# Patient Record
Sex: MALE | Race: WHITE | NOT HISPANIC OR LATINO | URBAN - METROPOLITAN AREA
[De-identification: names, ages, dates, MRNs, and addresses within clinical notes are randomized per-mention and may not be internally consistent; named-entity substitution may affect disease eponyms.]

---

## 2017-05-26 ENCOUNTER — OUTPATIENT (OUTPATIENT)
Dept: OUTPATIENT SERVICES | Facility: HOSPITAL | Age: 57
LOS: 1 days | Discharge: HOME | End: 2017-05-26

## 2017-06-28 DIAGNOSIS — E55.9 VITAMIN D DEFICIENCY, UNSPECIFIED: ICD-10-CM

## 2017-09-13 ENCOUNTER — OUTPATIENT (OUTPATIENT)
Dept: OUTPATIENT SERVICES | Facility: HOSPITAL | Age: 57
LOS: 1 days | Discharge: HOME | End: 2017-09-13

## 2017-09-13 DIAGNOSIS — M19.041 PRIMARY OSTEOARTHRITIS, RIGHT HAND: ICD-10-CM

## 2017-09-13 DIAGNOSIS — M65.841 OTHER SYNOVITIS AND TENOSYNOVITIS, RIGHT HAND: ICD-10-CM

## 2017-09-13 DIAGNOSIS — Z01.818 ENCOUNTER FOR OTHER PREPROCEDURAL EXAMINATION: ICD-10-CM

## 2017-09-27 ENCOUNTER — OUTPATIENT (OUTPATIENT)
Dept: OUTPATIENT SERVICES | Facility: HOSPITAL | Age: 57
LOS: 1 days | Discharge: HOME | End: 2017-09-27

## 2017-10-04 DIAGNOSIS — M19.041 PRIMARY OSTEOARTHRITIS, RIGHT HAND: ICD-10-CM

## 2017-10-04 DIAGNOSIS — I10 ESSENTIAL (PRIMARY) HYPERTENSION: ICD-10-CM

## 2017-10-04 DIAGNOSIS — E11.9 TYPE 2 DIABETES MELLITUS WITHOUT COMPLICATIONS: ICD-10-CM

## 2017-10-04 DIAGNOSIS — M65.841 OTHER SYNOVITIS AND TENOSYNOVITIS, RIGHT HAND: ICD-10-CM

## 2019-05-02 ENCOUNTER — OUTPATIENT (OUTPATIENT)
Dept: OUTPATIENT SERVICES | Facility: HOSPITAL | Age: 59
LOS: 1 days | Discharge: HOME | End: 2019-05-02

## 2019-05-02 DIAGNOSIS — Z00.00 ENCOUNTER FOR GENERAL ADULT MEDICAL EXAMINATION WITHOUT ABNORMAL FINDINGS: ICD-10-CM

## 2019-09-20 ENCOUNTER — EMERGENCY (EMERGENCY)
Facility: HOSPITAL | Age: 59
LOS: 0 days | Discharge: HOME | End: 2019-09-20
Attending: EMERGENCY MEDICINE | Admitting: EMERGENCY MEDICINE
Payer: COMMERCIAL

## 2019-09-20 VITALS
HEART RATE: 81 BPM | RESPIRATION RATE: 16 BRPM | DIASTOLIC BLOOD PRESSURE: 85 MMHG | TEMPERATURE: 98 F | OXYGEN SATURATION: 100 % | SYSTOLIC BLOOD PRESSURE: 121 MMHG | WEIGHT: 164.91 LBS | HEIGHT: 66 IN

## 2019-09-20 DIAGNOSIS — M54.41 LUMBAGO WITH SCIATICA, RIGHT SIDE: ICD-10-CM

## 2019-09-20 DIAGNOSIS — M54.9 DORSALGIA, UNSPECIFIED: ICD-10-CM

## 2019-09-20 PROCEDURE — 99283 EMERGENCY DEPT VISIT LOW MDM: CPT

## 2019-09-20 RX ORDER — VALSARTAN 80 MG/1
0 TABLET ORAL
Qty: 0 | Refills: 0 | DISCHARGE

## 2019-09-20 RX ORDER — ASPIRIN/CALCIUM CARB/MAGNESIUM 324 MG
1 TABLET ORAL
Qty: 0 | Refills: 0 | DISCHARGE

## 2019-09-20 RX ORDER — KETOROLAC TROMETHAMINE 30 MG/ML
30 SYRINGE (ML) INJECTION ONCE
Refills: 0 | Status: DISCONTINUED | OUTPATIENT
Start: 2019-09-20 | End: 2019-09-20

## 2019-09-20 RX ADMIN — Medication 30 MILLIGRAM(S): at 09:39

## 2019-09-20 NOTE — ED PROVIDER NOTE - NS ED ROS FT
Constitutional: (-) fever  Eyes/ENT: (-) blurry vision, (-) epistaxis  Cardiovascular: (-) chest pain, (-) syncope  Respiratory: (-) cough, (-) shortness of breath  Gastrointestinal: (-) vomiting, (-) diarrhea  : (-) dysuria, (-) hematuria  Musculoskeletal: (+) back pain, (-) neck pain, (-) joint pain  Integumentary: (-) rash, (-) edema  Neurological: (-) headache, (-) altered mental status  Allergic/Immunologic: (-) pruritus

## 2019-09-20 NOTE — ED PROVIDER NOTE - PATIENT PORTAL LINK FT
You can access the FollowMyHealth Patient Portal offered by [facility] by registering at the following website: [email or web address]. By joining [employer]’s FollowMyHealth portal, you will also be able to view your health information using other applications (apps) compatible with our system.

## 2019-09-20 NOTE — ED PROVIDER NOTE - NSFOLLOWUPCLINICS_GEN_ALL_ED_FT
[facility] ([facility])  Rehabilitation  [address] [place] [address]  Phone: [phone]  Fax:   Follow Up Time:

## 2019-09-20 NOTE — ED PROVIDER NOTE - OBJECTIVE STATEMENT
58y M PMH sciatica presents for evaluation of back pain. Pt states he has had mild stinging pain in his R lower back radiating down his R leg, worse with activity, relived at rest x1wk. Pt states it feels like his sciatica that has been relived with toradol in past. Denies numbness, weakness, fever, swelling, cp, sob, n/v/d/c

## 2019-09-20 NOTE — ED PROVIDER NOTE - PHYSICAL EXAMINATION
CONST: Well appearing in NAD  EYES: PERRL, EOMI, Sclera and conjunctiva clear.   ENT: No nasal discharge. Oropharynx normal appearing, no erythema or exudates. No abscess or swelling. Uvula midline.   NECK: Non-tender, no meningeal signs. normal ROM. supple   CARD: S1 S2; No jvd  RESP: Equal BS B/L, No wheezes, rhonchi or rales. No distress  GI: Soft, non-tender, non-distended. no cva tenderness. normal BS  MS: (+) straight leg test R leg. Normal ROM in all extremities. pulses 2 +. no calf tenderness or swelling  SKIN: Warm, dry, no acute rashes. Good turgor  NEURO: A&Ox3, No focal deficits. Strength 5/5 with no sensory deficits. Steady gait.

## 2019-09-20 NOTE — ED PROVIDER NOTE - PROGRESS NOTE DETAILS
ATTENDING NOTE:   57 y/o M presents to ED c/o right buttock pain similar to previous episodes of sciatica. Pt denies trauma, fever, and rash. Vital signs noted. (+) Tenderness over right sciatic notch. No neuro deficits. SXTX. Rehab follow up.

## 2019-09-20 NOTE — ED PROVIDER NOTE - CARE PROVIDER_API CALL
Carlitos Gong)  Neuromuscular Medicine  [address] [place] [address], [address]  [place], [place] [id number]  Phone: [phone]  Fax: [phone]  Follow Up Time:     Jori Craft)  Orthopaedic Surgery  [address] [place] [address]  Phone: [phone]  Fax: [phone]  Follow Up Time:

## 2019-09-20 NOTE — ED PROVIDER NOTE - NSFOLLOWUPINSTRUCTIONS_ED_ALL_ED_FT
Follow up with PMD and Orthopedics in 1-2 days.    Back Pain    Back pain is very common in adults. The cause of back pain is rarely dangerous and the pain often gets better over time. The cause of your back pain may not be known and may include strain of muscles or ligaments, degeneration of the spinal disks, or arthritis. Occasionally the pain may radiate down your leg(s). Over-the-counter medicines to reduce pain and inflammation are often the most helpful. Stretching and remaining active frequently helps the healing process.     SEEK IMMEDIATE MEDICAL CARE IF YOU HAVE ANY OF THE FOLLOWING SYMPTOMS: bowel or bladder control problems, unusual weakness or numbness in your arms or legs, nausea or vomiting, abdominal pain, fever, dizziness/lightheadedness. Follow up with PMD and Rehab clininc in 1-2 days.    Back Pain    Back pain is very common in adults. The cause of back pain is rarely dangerous and the pain often gets better over time. The cause of your back pain may not be known and may include strain of muscles or ligaments, degeneration of the spinal disks, or arthritis. Occasionally the pain may radiate down your leg(s). Over-the-counter medicines to reduce pain and inflammation are often the most helpful. Stretching and remaining active frequently helps the healing process.     SEEK IMMEDIATE MEDICAL CARE IF YOU HAVE ANY OF THE FOLLOWING SYMPTOMS: bowel or bladder control problems, unusual weakness or numbness in your arms or legs, nausea or vomiting, abdominal pain, fever, dizziness/lightheadedness.

## 2021-07-25 ENCOUNTER — EMERGENCY (EMERGENCY)
Facility: HOSPITAL | Age: 61
LOS: 0 days | Discharge: HOME | End: 2021-07-25
Attending: EMERGENCY MEDICINE | Admitting: EMERGENCY MEDICINE
Payer: COMMERCIAL

## 2021-07-25 VITALS
HEIGHT: 66 IN | OXYGEN SATURATION: 98 % | DIASTOLIC BLOOD PRESSURE: 78 MMHG | RESPIRATION RATE: 18 BRPM | TEMPERATURE: 97 F | WEIGHT: 164.91 LBS | SYSTOLIC BLOOD PRESSURE: 127 MMHG | HEART RATE: 78 BPM

## 2021-07-25 DIAGNOSIS — S80.211A ABRASION, RIGHT KNEE, INITIAL ENCOUNTER: ICD-10-CM

## 2021-07-25 DIAGNOSIS — S50.312A ABRASION OF LEFT ELBOW, INITIAL ENCOUNTER: ICD-10-CM

## 2021-07-25 DIAGNOSIS — Z79.899 OTHER LONG TERM (CURRENT) DRUG THERAPY: ICD-10-CM
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
## 2021-07-25 DIAGNOSIS — M25.511 PAIN IN RIGHT SHOULDER: ICD-10-CM

## 2021-07-25 DIAGNOSIS — S43.004A UNSPECIFIED DISLOCATION OF RIGHT SHOULDER JOINT, INITIAL ENCOUNTER: ICD-10-CM

## 2021-07-25 DIAGNOSIS — Z79.82 LONG TERM (CURRENT) USE OF ASPIRIN: ICD-10-CM
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
## 2021-07-25 DIAGNOSIS — I10 ESSENTIAL (PRIMARY) HYPERTENSION: ICD-10-CM

## 2021-07-25 PROCEDURE — 73030 X-RAY EXAM OF SHOULDER: CPT | Mod: 26,RT,76

## 2021-07-25 PROCEDURE — 23650 CLTX SHO DSLC W/MNPJ WO ANES: CPT | Mod: 54

## 2021-07-25 PROCEDURE — 99284 EMERGENCY DEPT VISIT MOD MDM: CPT | Mod: 57

## 2021-07-25 RX ORDER — KETOROLAC TROMETHAMINE 30 MG/ML
15 SYRINGE (ML) INJECTION ONCE
Refills: 0 | Status: DISCONTINUED | OUTPATIENT
Start: 2021-07-25 | End: 2021-07-25

## 2021-07-25 RX ADMIN — Medication 15 MILLIGRAM(S): at 21:23

## 2021-07-25 NOTE — ED PROVIDER NOTE - NS ED ROS FT
Constitutional: no fever, chills, no recent weight loss, change in appetite or malaise  Cardiac: No chest pain, SOB or edema.  Respiratory: No cough or respiratory distress  GI: No nausea, vomiting, diarrhea or abdominal pain.  MS: see HPI  Neuro: No headache or weakness. No LOC.  Skin: + abrasions  Endocrine: No history of thyroid disease or diabetes.

## 2021-07-25 NOTE — ED PROVIDER NOTE - PATIENT PORTAL LINK FT
You can access the FollowMyHealth Patient Portal offered by [facility] by registering at the following website: [email or web address]. By joining [employer]’s FollowMyHealth portal, you will also be able to view your health information using other applications (apps) compatible with our system.

## 2021-07-25 NOTE — ED PROVIDER NOTE - CLINICAL SUMMARY MEDICAL DECISION MAKING FREE TEXT BOX
XR right shoulder +dislocation.  Closed reduction done.  Placed on sling.  Post reduction XR ok.  Will D/C and refer to ortho.

## 2021-07-25 NOTE — ED PROVIDER NOTE - OBJECTIVE STATEMENT
59 yo male hx of HTN present c/o right shoulder s/p tripped and fell over a step with outstretch arm. denies head injury and loc. denies neck/back pain. + superficial abrasion over right knee and left elbow without bleeding.

## 2021-07-25 NOTE — ED PROVIDER NOTE - CARE PROVIDER_API CALL
Ulises Gonzalez  ORTHOPAEDIC SURGERY  [address] [place] [address]  Phone: [phone]  Fax: [phone]  Follow Up Time:

## 2021-07-25 NOTE — ED PROVIDER NOTE - ATTENDING CONTRIBUTION TO CARE
I personally evaluated the patient. I reviewed the Resident’s or Physician Assistant’s note (as assigned above), and agree with the findings and plan except as documented in my note.  Chart reviewed.  Fell and hurt right shoulder.  Exam shows deformed tender right shoulder, neurovascular intact.

## 2021-07-25 NOTE — ED PROVIDER NOTE - CARE PLAN
Principal Discharge DX:	Shoulder dislocation, right, initial encounter  Secondary Diagnosis:	Accidental fall

## 2021-07-25 NOTE — ED ADULT NURSE NOTE - NS ED NURSE LEVEL OF CONSCIOUSNESS ORIENTATION
Please review blood work and ultrasound report from 1/24.    Please advise.   Oriented - self; Oriented - place; Oriented - time

## 2021-07-25 NOTE — ED PROVIDER NOTE - PHYSICAL EXAMINATION
CONSTITUTIONAL: Well-appearing; well-nourished; in no apparent distress.   CARDIOVASCULAR: Normal S1, S2; no murmurs, rubs, or gallops.   RESPIRATORY: Normal chest excursion with respiration; breath sounds clear and equal bilaterally; no wheezes, rhonchi, or rales.  GI/: Normal bowel sounds; non-distended; non-tender; no palpable organomegaly.   MS: + deformity (elongated) right shoulder and holding in outstretch position c/w dislocation. non-tender to right elbow/wrist and hand. right hand nv intact. No midline tenderness to neck/back.   SKIN: + small superficial abrasions to left elbow and right knee. no bleeding.   NEURO/PSYCH: A & O x 4; grossly unremarkable.

## 2021-07-25 NOTE — ED PROVIDER NOTE - NSFOLLOWUPINSTRUCTIONS_ED_ALL_ED_FT
Shoulder Dislocation    A dislocation is a displacement of the bones that form your joint. This can result from a variety of trauma. Symptoms include pain, swelling, and deformity at the site. Your health care provider has performed maneuvers to place the bones back in place. If a splint or brace was applied make sure to continue to wear it until you see an orthopedist as instructed.     SEEK IMMEDIATE MEDICAL CARE IF YOU HAVE THE FOLLOWING SYMPTOMS: numbness, tingling, pain, weakness, or skin color/temperature change in any part of your body distal to the fracture.

## 2024-05-01 PROBLEM — I10 ESSENTIAL (PRIMARY) HYPERTENSION: Chronic | Status: ACTIVE | Noted: 2021-07-25

## 2024-06-11 ENCOUNTER — APPOINTMENT (OUTPATIENT)
Dept: SURGERY | Facility: CLINIC | Age: 64
End: 2024-06-11
Payer: COMMERCIAL

## 2024-06-11 VITALS — BODY MASS INDEX: 24.91 KG/M2 | WEIGHT: 155 LBS | HEIGHT: 66 IN

## 2024-06-11 DIAGNOSIS — K40.20 BILATERAL INGUINAL HERNIA, W/OUT OBSTRUCTION OR GANGRENE, NOT SPECIFIED AS RECURRENT: ICD-10-CM

## 2024-06-11 PROCEDURE — 99203 OFFICE O/P NEW LOW 30 MIN: CPT

## 2024-06-11 NOTE — PHYSICAL EXAM
[JVD] : no jugular venous distention  [Normal Breath Sounds] : Normal breath sounds [No Rash or Lesion] : No rash or lesion [Alert] : alert [Calm] : calm [de-identified] : Healthy [de-identified] : Normal [de-identified] : Mildly protuberant abdomen [de-identified] : Normal testicles [de-identified] : Bilateral inguinal hernias, reducible (left greater than right)

## 2024-06-11 NOTE — ASSESSMENT
[FreeTextEntry1] : Nathanael is a pleasant 63-year-old  gentleman with a past medical history significant for hypertension and diet-controlled diabetes now with intermittent discomfort and significant swelling in the left groin suspicious for a hernia.  He occasionally does moderately heavy lifting and strenuous activity around his home, including carrying cases of water.  Physical examination demonstrates a large tender bulge in the left groin which is easily reducible and a moderate to large less tender also reducible bulge in the right groin consistent with bilateral inguinal hernias warranting surgical repair.  There is no evidence of incarceration or strangulation, and the patient denies any symptoms of obstruction.  Both testicles are normal.  His umbilical examination is unremarkable.  His current BMI is 25.  I explained the pros and cons of surgery, as well as all risks, benefits, indications and alternatives of the procedure and the patient understood and agreed.  He will talk this over with his family and call back to schedule in the near future.  Nathanael is aware that the repair of his bilateral inguinal hernias with mesh will be done under LOCAL with IV SED[facility] at [facility] with presurgical testing waived.  He was encouraged to avoid heavy lifting and strenuous activity in the interim, of course.

## 2024-07-25 ENCOUNTER — NON-APPOINTMENT (OUTPATIENT)
Age: 64
End: 2024-07-25

## 2024-07-25 RX ORDER — TRAMADOL HYDROCHLORIDE 50 MG/1
50 TABLET, COATED ORAL
Qty: 20 | Refills: 0 | Status: ACTIVE | COMMUNITY
Start: 2024-07-25 | End: 1900-01-01

## 2024-07-29 ENCOUNTER — RESULT REVIEW (OUTPATIENT)
Age: 64
End: 2024-07-29

## 2024-07-29 ENCOUNTER — APPOINTMENT (OUTPATIENT)
Dept: SURGERY | Facility: AMBULATORY SURGERY CENTER | Age: 64
End: 2024-07-29
Payer: COMMERCIAL

## 2024-07-29 ENCOUNTER — TRANSCRIPTION ENCOUNTER (OUTPATIENT)
Age: 64
End: 2024-07-29

## 2024-07-29 PROCEDURE — 49507 PRP I/HERN INIT BLOCK >5 YR: CPT | Mod: LT

## 2024-07-29 PROCEDURE — 49505 PRP I/HERN INIT REDUC >5 YR: CPT | Mod: RT,XS

## 2024-08-05 ENCOUNTER — APPOINTMENT (OUTPATIENT)
Dept: SURGERY | Facility: CLINIC | Age: 64
End: 2024-08-05

## 2024-08-05 PROBLEM — K40.90 NON-RECURRENT UNILATERAL INGUINAL HERNIA WITHOUT OBSTRUCTION OR GANGRENE: Status: ACTIVE | Noted: 2024-08-05

## 2024-08-05 PROBLEM — K40.20 NON-RECURRENT BILATERAL INGUINAL HERNIA WITHOUT OBSTRUCTION OR GANGRENE: Noted: 2024-06-11

## 2024-08-05 PROBLEM — K40.30 NON-RECURRENT UNILATERAL INGUINAL HERNIA WITH OBSTRUCTION WITHOUT GANGRENE: Status: ACTIVE | Noted: 2024-08-05

## 2024-08-05 PROCEDURE — 99024 POSTOP FOLLOW-UP VISIT: CPT

## 2024-08-05 NOTE — CONSULT LETTER
[Dear  ___] : Dear  [unfilled], [Courtesy Letter:] : I had the pleasure of seeing your patient, [unfilled], in my office today. [Please see my note below.] : Please see my note below. [Consult Closing:] : Thank you very much for allowing me to participate in the care of this patient.  If you have any questions, please do not hesitate to contact me. [Sincerely,] : Sincerely, [FreeTextEntry3] :     Bertha Chou PA-C, MSPAS

## 2024-08-05 NOTE — REASON FOR VISIT
DAILY PEDIATRIC RESIDENT PROGRESS NOTE    ADMISSION DATE:  11/1/2023  DATE:  11/6/2023  CURRENT HOSPITAL DAY:  Hospital Day: 6       ACTIVE PROBLEMS:    Active Hospital Problems    Diagnosis    • Craniosynostosis of sagittal suture        SUMMARY STATEMENT:    Raad Singleton is a 13 month old male patient admitted with Sagittal craniosynostosis [Q75.01]  Craniosynostosis of sagittal suture [Q75.01]    SUBJECTIVE:  Obtained from mother, at bedside.    POD#5. Patient did well overnight, afebrile with stable vital signs. Periorbital edema reduced compared to previous days. PO intake increased throughout the day yesterday, continues to have good UOP. BM overnight. Mother is concerned about possible tongue tie and would like to get it evaluated. Pt will be seen by PT, ST, and ENT today.     Live video/phone  service used? No     Complete ROS reviewed with pertinent positives as noted above.    OBJECTIVE:      Vitals:    Vital Last Value 24 Hour Range   Temperature 97.9 °F (36.6 °C) (11/06/23 0037) Temp  Min: 97.5 °F (36.4 °C)  Max: 98.4 °F (36.9 °C)   Pulse 118 (11/06/23 0037) Pulse  Min: 109  Max: 124   Respiratory 32 (11/06/23 0037) Resp  Min: 30  Max: 65   Non-Invasive  Blood Pressure (!) 74/62 (11/06/23 0037) BP  Min: 74/62  Max: 126/91   Pulse Oximetry 96 % (11/06/23 0037) SpO2  Min: 96 %  Max: 100 %   Arterial   Blood Pressure 82/62 (11/01/23 1534) No data recorded        INTAKE/OUTPUT:      Intake/Output Summary (Last 24 hours) at 11/6/2023 0612  Last data filed at 11/6/2023 0100  Gross per 24 hour   Intake 1110 ml   Output 1897 ml   Net -787 ml         PHYSICAL EXAM:    Physical Exam  Vitals reviewed.   Constitutional:       General: He is sleeping and crying. He is not in acute distress.     Appearance: Normal appearance. He is not ill-appearing or toxic-appearing.      Comments: Patient is fussy with cares, consolable by parents   HENT:      Head: Normocephalic.      Comments: Patient with sutures  in place. Post-operative erythema at the suture site. Former drain site on the L now with dressing in place, C/D/I.     Right Ear: External ear normal.      Left Ear: External ear normal.      Nose: Nose normal.      Mouth/Throat:      Lips: Pink.      Mouth: Mucous membranes are moist.      Neck: Neck supple.   Eyes:      Conjunctiva/sclera: Conjunctivae normal.      Pupils: Pupils are equal, round, and reactive to light.      Comments: Resolution of periorbital edema noted on previous exams   Cardiovascular:      Rate and Rhythm: Normal rate and regular rhythm.      Pulses: Normal pulses.      Heart sounds: Normal heart sounds.   Pulmonary:      Effort: Pulmonary effort is normal. No tachypnea, accessory muscle usage, respiratory distress or nasal flaring.      Breath sounds: Normal breath sounds.   Abdominal:      General: Abdomen is flat. Bowel sounds are normal.      Palpations: Abdomen is soft. There is no hepatomegaly or splenomegaly.      Tenderness: There is no abdominal tenderness.   Skin:     General: Skin is warm.      Capillary Refill: Capillary refill takes less than 2 seconds.   Neurological:      Mental Status: He is easily aroused.         LABORATORY DATA:    Admission on 11/01/2023   Component Date Value Ref Range Status   • UNIT BLOOD TYPE 11/01/2023 O Pos   Final   • ISBT BLOOD TYPE 11/01/2023 5100   Final   • BLOOD EXPIRATION DATE 11/01/2023 [id number]   Final   • UNIT NUMBER 11/01/2023 [id number]   Final   • DISPENSE STATUS 11/01/2023 Transfused   Final   • PRODUCT ID 11/01/2023 Red Blood Cells   Final   • PRODUCT CODE 11/01/2023 [id number]   Final   • PRODUCT DESCRIPTION 11/01/2023 RBC AS-3 LR   Final   • CROSSMATCH RESULT 11/01/2023 Compatible   Final   • ISSUE DATE/TIME 11/01/2023 [id number]   Final   • Sodium 11/01/2023 144  135 - 145 mmol/L Final   • Potassium 11/01/2023 3.8  3.4 - 5.1 mmol/L Final   • Chloride 11/01/2023 115 (H)  97 - 110 mmol/L Final   • Carbon Dioxide  11/01/2023 17 (L)  21 - 32 mmol/L Final   • Anion Gap 11/01/2023 16  7 - 19 mmol/L Final   • Glucose 11/01/2023 147 (H)  70 - 99 mg/dL Final   • BUN 11/01/2023 26 (H)  5 - 18 mg/dL Final   • Creatinine 11/01/2023 0.37  0.16 - 0.59 mg/dL Final   • Glomerular Filtration Rate 11/01/2023    Final    GFR not calculated for age less than 18 years   • BUN/Cr 11/01/2023 70 (H)  7 - 25 Final   • Calcium 11/01/2023 8.4  8.0 - 11.0 mg/dL Final   • Protime- PT 11/01/2023 11.5  9.7 - 11.8 sec Final   • INR 11/01/2023 1.1    Final    INR Therapeutic Range: 2.0 to 3.0 (2.5 to 3.5 recommended for recurrent thrombotic episodes and mechanical prosthetic heart valves.)   • PTT 11/01/2023 <21 (L)  22 - 32 sec Final   • WBC 11/01/2023 21.8 (H)  5.0 - 19.5 K/mcL Final   • RBC 11/01/2023 4.34  3.10 - 4.50 mil/mcL Final   • HGB 11/01/2023 12.4  10.5 - 13.5 g/dL Final   • HCT 11/01/2023 36.9  29.0 - 41.0 % Final   • MCV 11/01/2023 85.0  70.0 - 86.0 fl Final   • MCH 11/01/2023 28.6  23.0 - 31.0 pg Final   • MCHC 11/01/2023 33.6  30.0 - 36.0 g/dL Final   • RDW-CV 11/01/2023 13.3  11.0 - 15.0 % Final   • RDW-SD 11/01/2023 41.1  35.0 - 47.0 fL Final   • PLT 11/01/2023 374  140 - 450 K/mcL Final   • NRBC 11/01/2023 0  <=0 /100 WBC Final   • Neutrophil, Percent 11/01/2023 67  % Final   • Lymphocytes, Percent 11/01/2023 17  % Final   • Mono, Percent 11/01/2023 16  % Final   • Absolute Neutrophil 11/01/2023 14.6 (H)  1.5 - 8.5 K/mcL Final   • Absolute Lymphocytes 11/01/2023 3.7 (L)  4.0 - 10.5 K/mcL Final   • Absolute Monocytes 11/01/2023 3.5 (H)  0.0 - 0.8 K/mcL Final   • WBC Morphology 11/01/2023 Normal  Normal Final   • Usman Cells 11/01/2023 Few   Final   • Ovalocytes 11/01/2023 Few   Final   • Platelet Morphology 11/01/2023 Normal  Normal Final   • PH, ARTERIAL - POINT OF CARE 11/01/2023 7.29 (L)  7.35 - 7.45 Units Final   • PCO2, ARTERIAL - POINT OF CARE 11/01/2023 36  35 - 48 mm Hg Final   • PO2, ARTERIAL - POINT OF CARE 11/01/2023 250 (H)   83 - 108 mm Hg Final   • TCO2 - POINT OF CARE 11/01/2023 18 (L)  19 - 24 mmol/L Final   • BASE EXCESS / DEFICIT, ARTERIAL - * 11/01/2023 -10 (L)  -2 - 3 mmol/L Final   • O2 SATURATION, ARTERIAL - POINT OF* 11/01/2023 100 (H)  95 - 99 % Final   • SODIUM - POINT OF CARE 11/01/2023 141  135 - 145 mmol/L Final   • POTASSIUM - POINT OF CARE 11/01/2023 3.8  3.4 - 5.1 mmol/L Final   • CALCIUM, IONIZED - POINT OF CARE 11/01/2023 1.17  1.15 - 1.29 mmol/L Final   • HEMATOCRIT - POINT OF CARE 11/01/2023 27.0 (L)  29.0 - 41.0 % Final   • SODIUM - POINT OF CARE 11/01/2023 141  135 - 145 mmol/L Final   • WBC 11/02/2023 14.4  5.0 - 19.5 K/mcL Final   • RBC 11/02/2023 3.60  3.10 - 4.50 mil/mcL Final   • HGB 11/02/2023 10.4 (L)  10.5 - 13.5 g/dL Final   • HCT 11/02/2023 29.8  29.0 - 41.0 % Final   • MCV 11/02/2023 82.8  70.0 - 86.0 fl Final   • MCH 11/02/2023 28.9  23.0 - 31.0 pg Final   • MCHC 11/02/2023 34.9  30.0 - 36.0 g/dL Final   • RDW-CV 11/02/2023 12.9  11.0 - 15.0 % Final   • RDW-SD 11/02/2023 38.8  35.0 - 47.0 fL Final   • PLT 11/02/2023 260  140 - 450 K/mcL Final   • NRBC 11/02/2023 0  <=0 /100 WBC Final   • Neutrophil, Percent 11/02/2023 66  % Final   • Lymphocytes, Percent 11/02/2023 15  % Final   • Mono, Percent 11/02/2023 16  % Final   • Eosinophils, Percent 11/02/2023 2  % Final   • Basophils, Percent 11/02/2023 0  % Final   • Immature Granulocytes 11/02/2023 1  % Final   • Absolute Neutrophils 11/02/2023 9.5 (H)  1.5 - 8.5 K/mcL Final   • Absolute Lymphocytes 11/02/2023 2.2 (L)  4.0 - 10.5 K/mcL Final   • Absolute Monocytes 11/02/2023 2.3 (H)  0.0 - 0.8 K/mcL Final   • Absolute Eosinophils  11/02/2023 0.2  0.0 - 0.7 K/mcL Final   • Absolute Basophils 11/02/2023 0.0  0.0 - 0.2 K/mcL Final   • Absolute Immature Granulocytes 11/02/2023 0.1  0.0 - 0.2 K/mcL Final   • WBC 11/04/2023 13.9  5.0 - 19.5 K/mcL Final   • RBC 11/04/2023 3.28  3.10 - 4.50 mil/mcL Final   • HGB 11/04/2023 9.3 (L)  10.5 - 13.5 g/dL Final   • HCT  11/04/2023 27.2 (L)  29.0 - 41.0 % Final   • MCV 11/04/2023 82.9  70.0 - 86.0 fl Final   • MCH 11/04/2023 28.4  23.0 - 31.0 pg Final   • MCHC 11/04/2023 34.2  30.0 - 36.0 g/dL Final   • RDW-CV 11/04/2023 13.1  11.0 - 15.0 % Final   • RDW-SD 11/04/2023 39.8  35.0 - 47.0 fL Final   • PLT 11/04/2023 306  140 - 450 K/mcL Final   • NRBC 11/04/2023 0  <=0 /100 WBC Final   • Neutrophil, Percent 11/04/2023 49  % Final   • Lymphocytes, Percent 11/04/2023 44  % Final   • Mono, Percent 11/04/2023 6  % Final   • Reactive Lymphocytes, Percent 11/04/2023 1  0 - 5 % Final   • Absolute Neutrophil 11/04/2023 6.8  1.5 - 8.5 K/mcL Final   • Absolute Lymphocytes 11/04/2023 6.3  4.0 - 10.5 K/mcL Final   • Absolute Monocytes 11/04/2023 0.8  0.0 - 0.8 K/mcL Final   • RBC Morphology 11/04/2023 Normal  Normal Final   • WBC Morphology 11/04/2023 Normal  Normal Final   • Platelet Morphology 11/04/2023 Normal  Normal Final        IMAGING STUDIES:    XR SKULL 1 TO 3 VIEWS   Final Result   FINDINGS/IMPRESSION:   Postoperative changes: Left occipitotemporal drain with tip in the left   occipital region. Multiple craniotomies are seen bilaterally. There is a   depression in the parietal vertex seen on the lateral view. This is to the   left of midline.         Electronically Signed by: GIRISH SALDIVAR M.D.    Signed on: 11/2/2023 7:43 AM    Workstation ID: [id number]          MEDICATIONS:    Current Facility-Administered Medications   Medication Dose Route Frequency Provider Last Rate Last Admin   • acetaminophen (TYLENOL) 160 MG/5ML suspension 166.4 mg  15 mg/kg (Dosing Weight) Oral 4 times per day Mary Moncada DO   166.4 mg at 11/06/23 0037   • morphine 10 MG/5ML solution 2.22 mg  0.2 mg/kg (Dosing Weight) Oral Q6H PRN Mary Moncada,        • bacitracin ointment   Topical BID Sarah Shannon CNP   Given at 11/05/23 2030   • lidocaine (ANECREAM/LMX) 4 % cream 1 application.  1 application. Topical PRN Rocio Woody MD        • sodium chloride 0.9 % injection 0.6-4.6 mL  0.6-4.6 mL Intravenous PRN Rocio Woody MD       • sodium chloride 0.9 % flush bag 25 mL  25 mL Intravenous PRN Rocio Woody MD       • sodium chloride 0.9 % injection 0.5-10 mL  0.5-10 mL Intravenous PRN Rocio Woody MD       • ondansetron (ZOFRAN) injection 1.12 mg  0.1 mg/kg (Dosing Weight) Intravenous Q6H PRN Jen Garvin MD   1.12 mg at 11/02/23 2127   • polyethylene glycol (MIRALAX) packet 8.5 g  8.5 g Oral Daily BidJen noyola MD   8.5 g at 11/05/23 0933   • [Held by provider] magnesium hydroxide (MILK OF MAGNESIA) 400 MG/5ML suspension 5 mL  5 mL Oral Daily Jen Garvin MD   5 mL at 11/03/23 1017                                ASSESSMENT:  Raad Singleton is a 13 month old male with no significant PMH presented with sagittal craniosynostosis. Patient was admitted to PICU for monitoring post-operatively. Patient transferred to the floor in stable condition after de-escalation of care to continue post-operative recovery and monitoring. Patient is currently POD#5 and recovering well. MAKAYLA drain removed 11/4 in light of decreasing output. Plan to transition to oral pain medication as PO intake continues to improve.      Problem List:  Principal Problem:    Craniosynostosis of sagittal suture    PLAN:  Neuro/Pain:  - HOB elevated to 30 degrees  - No NSAIDs  - Scheduled Tylenol    - Transitioned back to rectal per Neurosurgery recs  - Neurochecks to q4h  - MAKAYLA drain removed 11/4, dressing in place    Resp:  - on RA    CV:  - HDS  - Continuous telemetry monitoring    FEN/GI:  - PO bowel regimen started: Miralax daily  - Discontinued Scopolamine patch  - Zofran prn for nausea    ID:  - S/p Ancef 24 h post operatively  - Bacitracin BID on sutures   Antibiotic Decision Making  Patient on Antibiotics: - No    Heme/Onc:  - Hgb stable @ 9.3    Other:   - PT/OT/Speech to evaluate today  - ENT to evaluate for tongue tie and history of speech delay      VTE: 1 (Surgery), not at baseline mobility, too small for SCDs  Lines: PIV. Function, utilization, and necessity addressed/discussed on rounds  Dispo: Patient will be ready for discharge pending full recovery, discontinuation of IVF, return to good PO intake, and transition to oral pain control medications.    We personally discussed the current management plan with patient and family, who stated understating of, and agreement with, current plan. All of their questions and concerns were addressed.    RN present on rounds/updated after rounds.    Note to Caregivers:  The 21st Century Cures Act makes medical notes available to patients in the interest of transparency.  However, please be advised that this is a medical document.  It is intended as [id number] communication.  It is written in medical language and may contain abbreviations or verbiage that are technical and unfamiliar.  It may appear blunt or direct.  Medical documents are intended to carry relevant information, facts as evident, and the clinical opinion of the practitioner.    Mere De La O, DO  Family Medicine PGY-1  Please contact via [facility]     11/6/2023  6:12 AM                 [Post Op: _________] : a [unfilled] post op visit

## 2024-09-01 NOTE — ASSESSMENT
[FreeTextEntry1] : MARVIN WHITE underwent a very large indirect incarcerated left inguinal hernia repair with mesh and a large direct right inguinal hernia repair with mesh with Dr. Vicente on 7/29/24  under local IV sedation without any problems or complications. His wound is clean, dry and intact. There is no evidence of erythema, seroma formation or infection. He is tolerating a diet and having normal bowel movements. He denies any significant postoperative pain or discomfort at this time.   He was counseled and reassured. MARVIN was discharged from the office with no specific follow up necessary, but he knows to avoid any heavy lifting or strenuous activity for the next several weeks. (3) 3 to less than 7 years old